# Patient Record
(demographics unavailable — no encounter records)

---

## 2024-11-22 NOTE — ASSESSMENT
[FreeTextEntry1] : Assessment: Mr. HECTOR STEVENS is a 61 year year old man with severe bothersome LUTS and ED with initial improvement of LUTS and ED on tadalafil 5 mg daily, occasional episodes of urge incontinence, started gemtesa 75 mg in 7/2023. No episodes of urge incontinence since starting. Occasional change in symptoms, but not consistent. Had rectal pressure when urinating at times, resolved. Unclear etiology. Recommend patient come to office for PVR check to make sure he is emptying.   - Continue tadalafil 5 mg QD for LUTS and ED - Can take tadalafil 10 mg prn for ED  - Continue gemtesa 75 mg QD  - F/U in office for UA, IPSS, PVR, PSA

## 2024-11-22 NOTE — HISTORY OF PRESENT ILLNESS
[Home] : at home, [unfilled] , at the time of the visit. [Medical Office: ([facility])___] : at the medical office located in  [Verbal consent obtained from patient] : the patient, [unfilled] [FreeTextEntry1] : 4/21/23: 61 year old man presents with various LUTS.  Symptoms have gradually worsened over time. Characterized by weak stream, intermittency, incomplete emptying, urine spraying. Rarely has urge and urge incontinence that is very bothersome. Recently started saw palmetto. No fevers, chills, dysuria, hematuria. He has GI symptoms with occasional constipation, urgency and incontinence as well.  IPSS 23, QOL 4, PVR ~20 cc   He has erectile dysfunction and currently takes tadalafil 20 mg as needed.  PSA 1.77--3/2023  6/2/23: Patient presents for followup. Has been on tadalafil daily. Improvement of symptoms. Denies any dysuria or gross hematuria.   PVR: 35 ml  IPSS: 13, QOL: 3  no dysuria no gross hematuria   6/26/23:  TEB to discuss change in urinary symptoms. In last ten days, has had increase in urgency and urge incontinence, 1-2 times per day. In the last two days it has improved. He denies fevers, chills, dysuria, hematuria.  7/10/23: TEB to discuss current symptoms. Overall, urge and urge incontinence have improved and are essentially back to baseline. UA and urine culture were negative.  9/5/23: TEB to discuss current symptoms. No episodes of urge incontinence since starting gemtesa. He does feel that he is not emptying as well and frequency has increased.   11/22/24: TEB to discuss current symptoms. Taking gemtesa with continued improvement in urgency, still with frequency. For a period of time felt pressure in rectum when urinating. Resolved.

## 2024-11-22 NOTE — LETTER BODY
[Dear  ___] : Dear  [unfilled], [Courtesy Letter:] : I had the pleasure of seeing your patient, [unfilled], in my office today. [Please see my note below.] : Please see my note below. [Consult Closing:] : Thank you very much for allowing me to participate in the care of this patient.  If you have any questions, please do not hesitate to contact me. [Sincerely,] : Sincerely, [FreeTextEntry3] : Erin Hidalgo MD

## 2025-01-03 NOTE — HISTORY OF PRESENT ILLNESS
[FreeTextEntry1] : 4/21/23: 61 year old man presents with various LUTS.  Symptoms have gradually worsened over time. Characterized by weak stream, intermittency, incomplete emptying, urine spraying. Rarely has urge and urge incontinence that is very bothersome. Recently started saw palmetto. No fevers, chills, dysuria, hematuria. He has GI symptoms with occasional constipation, urgency and incontinence as well.  IPSS 23, QOL 4, PVR ~20 cc   He has erectile dysfunction and currently takes tadalafil 20 mg as needed.  PSA 1.77--3/2023  6/2/23: Patient presents for followup. Has been on tadalafil daily. Improvement of symptoms. Denies any dysuria or gross hematuria.   PVR: 35 ml  IPSS: 13, QOL: 3  no dysuria no gross hematuria   6/26/23:  TEB to discuss change in urinary symptoms. In last ten days, has had increase in urgency and urge incontinence, 1-2 times per day. In the last two days it has improved. He denies fevers, chills, dysuria, hematuria.  7/10/23: TEB to discuss current symptoms. Overall, urge and urge incontinence have improved and are essentially back to baseline. UA and urine culture were negative.  9/5/23: TEB to discuss current symptoms. No episodes of urge incontinence since starting gemtesa. He does feel that he is not emptying as well and frequency has increased.   11/22/24: TEB to discuss current symptoms. Taking gemtesa with continued improvement in urgency, still with frequency. For a period of time felt pressure in rectum when urinating. Resolved.  1/3/25: Rectal pressure resolved. Has had blood in stool for last two weeks, getting GI evaluation. Urinary symptoms otherwise stable and improved from prior with gemtesa and tadalafil.  IPSS 17, QOL 3, PVR 1cc  PSA 4/2024: 2.14

## 2025-01-03 NOTE — ASSESSMENT
[FreeTextEntry1] : Assessment: Mr. HECTOR STEVENS is a 61 year year old man with severe bothersome LUTS and ED with initial improvement of LUTS and ED on tadalafil 5 mg daily, occasional episodes of urge incontinence, started gemtesa 75 mg in 7/2023. No episodes of urge incontinence since starting. Occasional change in symptoms, but not consistent. OVerall doing well.   - Continue tadalafil 5 mg QD for LUTS and ED - Can take tadalafil 10 mg prn for ED  - Continue gemtesa 75 mg QD  - F/U in office for UA, IPSS, PVR, PSA in one year